# Patient Record
Sex: FEMALE | Race: WHITE | NOT HISPANIC OR LATINO | URBAN - METROPOLITAN AREA
[De-identification: names, ages, dates, MRNs, and addresses within clinical notes are randomized per-mention and may not be internally consistent; named-entity substitution may affect disease eponyms.]

---

## 2024-08-09 ENCOUNTER — EMERGENCY (EMERGENCY)
Facility: HOSPITAL | Age: 69
LOS: 1 days | Discharge: HOME | End: 2024-08-09
Attending: EMERGENCY MEDICINE | Admitting: EMERGENCY MEDICINE
Payer: MEDICARE

## 2024-08-09 VITALS
TEMPERATURE: 98 F | DIASTOLIC BLOOD PRESSURE: 84 MMHG | OXYGEN SATURATION: 97 % | WEIGHT: 145.06 LBS | HEART RATE: 63 BPM | SYSTOLIC BLOOD PRESSURE: 157 MMHG | RESPIRATION RATE: 17 BRPM

## 2024-08-09 PROCEDURE — 99284 EMERGENCY DEPT VISIT MOD MDM: CPT

## 2024-08-09 RX ORDER — IBUPROFEN 200 MG
600 TABLET ORAL ONCE
Refills: 0 | Status: COMPLETED | OUTPATIENT
Start: 2024-08-09 | End: 2024-08-09

## 2024-08-09 NOTE — ED ADULT TRIAGE NOTE - CHIEF COMPLAINT QUOTE
pt c/o R forearm pain, bruising, and swelling s/p trip and fall from kitchen stool. denies LOC, blood thinner use, head trauma. noted to be minimally swollen, no open abrasions/wounds. did not take pain meds  +ROM sensation intact   denies medical history

## 2024-08-10 PROCEDURE — 73090 X-RAY EXAM OF FOREARM: CPT | Mod: 26,RT

## 2024-08-10 RX ORDER — NAPROXEN 250 MG
1 TABLET ORAL
Qty: 14 | Refills: 0
Start: 2024-08-10 | End: 2024-08-16

## 2024-08-10 RX ADMIN — Medication 600 MILLIGRAM(S): at 00:13

## 2024-08-10 NOTE — ED ADULT NURSE NOTE - NSFALLRISKINTERV_ED_ALL_ED

## 2024-08-10 NOTE — ED ADULT NURSE NOTE - OBJECTIVE STATEMENT
Pt arrives to intake room 15 A&Ox3 and ambulatory at baseline. Denies any PH. PSH of Hysterectomy. Pt comes to ED c/p pain, edema and bruising to R wrist. Pt states Monday she fell backwards off a stool while fixing curtains. Pt denies head strike, LOC and blood thinner use. Denies headache, dizziness, chest pain, SOB, fevers, chills, nausea, vomiting and diarrhea. Respirations even and unlabored on room air. No acute distress noted. R wrist noted to be swollen and bruised. +2 pulses bilaterally, +ROM. Pt medicated per EMAR. Plan of care ongoing, comfort measures provided and safety measures maintained. Awaiting XR.

## 2024-08-10 NOTE — ED PROVIDER NOTE - PATIENT PORTAL LINK FT
You can access the FollowMyHealth Patient Portal offered by [facility] by registering at the following website: [email or web address]. By joining [email or web address]’s FollowMyHealth portal, you will also be able to view your health information using other applications (apps) compatible with our system.

## 2024-08-10 NOTE — ED PROVIDER NOTE - NSFOLLOWUPINSTRUCTIONS_ED_ALL_ED_FT
We've got you covered from head to toe    Our specialty programs:    [facility]  [phone]  [email or web address]    Giovanny (Scheduling) team hours of operation:  Monday through Thursday, 7am to 8:30pm  Friday, 7am to 7pm  Saturday, 8am to 4pm    Contusion in Adults  WHAT YOU NEED TO KNOW:  A contusion is a bruise that appears on your skin after an injury. A bruise happens when small blood vessels tear but skin does not. Blood leaks into nearby tissue, such as soft tissue or muscle.  DISCHARGE INSTRUCTIONS:  Seek care immediately if:   •You have new trouble moving the injured area.  •You have tingling or numbness in or near the injured area.  •Your hand or foot below the bruise gets cold or turns pale.  Call your doctor if:   •You find a new lump in the injured area.  •Your symptoms do not improve with treatment after 4 to 5 days.  •You have questions or concerns about your condition or care.  Medicines: You may need any of the following:   •NSAIDs, such as ibuprofen, help decrease swelling, pain, and fever. This medicine is available with or without a doctor's order. NSAIDs can cause stomach bleeding or kidney problems in certain people. If you take blood thinner medicine, always ask your healthcare provider if NSAIDs are safe for you. Always read the medicine label and follow directions.  •Prescription pain medicine may be given. Ask your healthcare provider how to take this medicine safely. Some prescription pain medicines contain acetaminophen. Do not take other medicines that contain acetaminophen without talking to your healthcare provider. Too much acetaminophen may cause liver damage. Prescription pain medicine may cause constipation. Ask your healthcare provider how to prevent or treat constipation.   •Take your medicine as directed. Contact your healthcare provider if you think your medicine is not helping or if you have side effects. Tell him or her if you are allergic to any medicine. Keep a list of the medicines, vitamins, and herbs you take. Include the amounts, and when and why you take them. Bring the list or the pill bottles to follow-up visits. Carry your medicine list with you in case of an emergency.  Help a contusion heal:   •Rest the injured area or use it less than usual. If you bruised your leg or foot, you may need crutches or a cane to help you walk. This will help you keep weight off your injured body part.  •Apply ice to decrease swelling and pain. Ice may also help prevent tissue damage. Use an ice pack, or put crushed ice in a plastic bag. Cover it with a towel and place it on your bruise for 15 to 20 minutes every hour or as directed.  •Use compression to support the area and decrease swelling. Wrap an elastic bandage around the area over the bruised muscle. Make sure the bandage is not too tight. You should be able to fit 1 finger between the bandage and your skin.  •Elevate (raise) your injured body part above the level of your heart to help decrease pain and swelling. Use pillows, blankets, or rolled towels to elevate the area as often as you can.  •Do not drink alcohol as directed. Alcohol may slow healing.  •Do not stretch injured muscles right after your injury. Ask your healthcare provider when and how you may safely stretch after your injury. Gentle stretches can help increase your flexibility.•Do not massage the area or put heating pads on the bruise right after your injury. Heat and massage may slow healing. Your healthcare provider may tell you to apply heat after several days. At that time, heat will start to help the injury heal.  Prevent another contusion:   •Stretch and warm up before you play sports or exercise.  •Wear protective gear when you play sports. Examples are shin guards and padding.   •If you begin a new physical activity, start slowly to give your body a chance to adjust.  Follow up with your doctor as directed: Write down your questions so you remember to ask them during your visits.

## 2024-08-10 NOTE — ED PROVIDER NOTE - OBJECTIVE STATEMENT
The patient is a 68y Female who has no pertinent past medical and surgery history PTED with pt c/o R forearm pain, bruising, and swelling s/p trip and fall from kitchen stool several days ago. When she fell she had no LOC but did hit her head and currently has no complaints head trauma. Wound has gotten less swollen but but patient concerned about slow process so PTED; +ROM sensation intact

## 2024-08-10 NOTE — ED PROVIDER NOTE - SKIN, MLM
Skin normal color for race, warm, dry and intact. Marked ecchyomsis noted over volar surface of right forearm minimal swelling

## 2024-08-10 NOTE — ED PROVIDER NOTE - MUSCULOSKELETAL, MLM
Spine appears normal, range of motion is not limited,  patient able to flex ext abduct adduct rt wrist  wrist w/o difficulty intact sensation; no other muscle or joint tenderness

## 2024-08-10 NOTE — ED PROVIDER NOTE - CLINICAL SUMMARY MEDICAL DECISION MAKING FREE TEXT BOX
The patient is a 68y Female who has no pertinent past medical and surgery history PTED with pt c/o R forearm pain, bruising, and swelling s/p trip and fall from kitchen stool several days ago. When she fell she had no LOC but did hit her head and currently has no complaints head trauma. Wound has gotten less swollen but but patient concerned about slow process so PTED; +ROM sensation intact   Vital Signs Stable  PE: as described; Marked ecchyomsis noted over volar surface of right forearm minimal swelling patient able to flex ext abduct adduct wrist w/o difficulty intact sensation     Xray IMPRESSION:  No acute fracture or dislocation.     IMPRESSION/RISK:  Dx=  Contusion   Consideration Currently no "red flags" in history = risk of duputryens contracture risk  Plan  Proper ice and elevation needs stressed attempt to limit activity pain/antiinflammatory medications and referral to ortho;   RTED PRN